# Patient Record
Sex: FEMALE | Race: WHITE | NOT HISPANIC OR LATINO | Employment: STUDENT | URBAN - METROPOLITAN AREA
[De-identification: names, ages, dates, MRNs, and addresses within clinical notes are randomized per-mention and may not be internally consistent; named-entity substitution may affect disease eponyms.]

---

## 2023-04-18 ENCOUNTER — LAB (OUTPATIENT)
Dept: LAB | Facility: LAB | Age: 23
End: 2023-04-18
Payer: COMMERCIAL

## 2023-04-18 ENCOUNTER — OFFICE VISIT (OUTPATIENT)
Dept: PEDIATRICS | Facility: CLINIC | Age: 23
End: 2023-04-18
Payer: COMMERCIAL

## 2023-04-18 VITALS
BODY MASS INDEX: 20.99 KG/M2 | OXYGEN SATURATION: 96 % | RESPIRATION RATE: 20 BRPM | WEIGHT: 134 LBS | TEMPERATURE: 98.5 F | HEART RATE: 88 BPM

## 2023-04-18 DIAGNOSIS — J35.8 TONSILLITH: ICD-10-CM

## 2023-04-18 DIAGNOSIS — R53.83 FATIGUE, UNSPECIFIED TYPE: Primary | ICD-10-CM

## 2023-04-18 DIAGNOSIS — R53.83 FATIGUE, UNSPECIFIED TYPE: ICD-10-CM

## 2023-04-18 DIAGNOSIS — J01.90 ACUTE SINUSITIS, RECURRENCE NOT SPECIFIED, UNSPECIFIED LOCATION: ICD-10-CM

## 2023-04-18 DIAGNOSIS — F43.21 ADJUSTMENT DISORDER WITH DEPRESSED MOOD: ICD-10-CM

## 2023-04-18 PROBLEM — J30.2 SEASONAL ALLERGIES: Status: ACTIVE | Noted: 2023-04-18

## 2023-04-18 PROBLEM — S06.0X9D: Status: ACTIVE | Noted: 2023-04-18

## 2023-04-18 PROBLEM — M79.604 PAIN IN RIGHT LEG: Status: ACTIVE | Noted: 2023-04-18

## 2023-04-18 PROBLEM — Z87.42 PERSONAL HISTORY OF OTHER DISEASES OF THE FEMALE GENITAL TRACT: Status: ACTIVE | Noted: 2023-04-18

## 2023-04-18 PROBLEM — Z30.9 ENCOUNTER FOR CONTRACEPTIVE MANAGEMENT, UNSPECIFIED: Status: ACTIVE | Noted: 2023-04-18

## 2023-04-18 PROCEDURE — 99213 OFFICE O/P EST LOW 20 MIN: CPT | Performed by: PEDIATRICS

## 2023-04-18 PROCEDURE — 36415 COLL VENOUS BLD VENIPUNCTURE: CPT

## 2023-04-18 PROCEDURE — 80053 COMPREHEN METABOLIC PANEL: CPT

## 2023-04-18 PROCEDURE — 84443 ASSAY THYROID STIM HORMONE: CPT

## 2023-04-18 PROCEDURE — 84481 FREE ASSAY (FT-3): CPT

## 2023-04-18 PROCEDURE — 85025 COMPLETE CBC W/AUTO DIFF WBC: CPT

## 2023-04-18 PROCEDURE — 82306 VITAMIN D 25 HYDROXY: CPT

## 2023-04-18 RX ORDER — AZITHROMYCIN 250 MG/1
TABLET, FILM COATED ORAL
Qty: 6 TABLET | Refills: 0 | Status: SHIPPED | OUTPATIENT
Start: 2023-04-18 | End: 2023-04-23

## 2023-04-18 RX ORDER — ALBUTEROL SULFATE 90 UG/1
2 AEROSOL, METERED RESPIRATORY (INHALATION) EVERY 6 HOURS PRN
Qty: 18 G | Refills: 2 | Status: SHIPPED | OUTPATIENT
Start: 2023-04-18 | End: 2024-01-08 | Stop reason: SDUPTHER

## 2023-04-18 NOTE — PROGRESS NOTES
Subjective   Chief Complaint: Cough.  KYLIE Machuca is a 22 y.o. female who presents for Cough, who is accompanied by her  self .    There has been a 4 day history of cough and congestion.  There has not been a fever during this illness.  There has been vomiting or diarrhea.  Brigette has not been able to sleep as well as normal due to these symptoms.   Her cough is has been productive.      She had a rough break-up in February that has resulted in her ex taking her to court.  She has been tearful, sad, and depressed.  She has had some episodes prior to that.  Her grades have suffered.  She has lost 12# and is sleeping more than usual.      Would like to see ENT for recurrent tonsillitis and halitosis and recurrent tonsilliths    Review of Systems    Objective     Pulse 88   Temp 36.9 °C (98.5 °F)   Resp 20   Wt 60.8 kg (134 lb)   SpO2 96%   BMI 20.99 kg/m²     Physical Exam  Constitutional:       Appearance: Normal appearance.   HENT:      Head: Normocephalic and atraumatic.      Right Ear: Tympanic membrane normal.      Left Ear: Tympanic membrane normal.      Nose: Congestion and rhinorrhea present.      Mouth/Throat:      Mouth: Mucous membranes are moist.   Cardiovascular:      Rate and Rhythm: Normal rate and regular rhythm.   Pulmonary:      Effort: Pulmonary effort is normal.      Breath sounds: Normal breath sounds.   Musculoskeletal:      Cervical back: Neck supple.   Neurological:      Mental Status: She is alert.         Assessment/Plan   Problem List Items Addressed This Visit       Acute sinusitis    Relevant Medications    azithromycin (Zithromax) 250 mg tablet    Fatigue - Primary    Relevant Orders    CBC and Auto Differential    Triiodothyronine, Free    Thyroid Stimulating Hormone    Vitamin D 25 hydroxy    Comprehensive Metabolic Panel    Adjustment disorder with depressed mood    Tonsillith    Relevant Orders    Referral to ENT

## 2023-04-18 NOTE — PATIENT INSTRUCTIONS
Take antibiotic as directed for the next 10 days.    Will call with lab results.  Consider treatment for mood and/or consider counseling.      Encourage rest and fluids.    Tylenol and ibuprofen as needed.    Call in 2-3 days if not better.      Referred to ENT.

## 2023-04-19 LAB
ALANINE AMINOTRANSFERASE (SGPT) (U/L) IN SER/PLAS: 10 U/L (ref 7–45)
ALBUMIN (G/DL) IN SER/PLAS: 4.6 G/DL (ref 3.4–5)
ALKALINE PHOSPHATASE (U/L) IN SER/PLAS: 76 U/L (ref 33–110)
ANION GAP IN SER/PLAS: 12 MMOL/L (ref 10–20)
ASPARTATE AMINOTRANSFERASE (SGOT) (U/L) IN SER/PLAS: 16 U/L (ref 9–39)
BASOPHILS (10*3/UL) IN BLOOD BY AUTOMATED COUNT: 0.04 X10E9/L (ref 0–0.1)
BASOPHILS/100 LEUKOCYTES IN BLOOD BY AUTOMATED COUNT: 0.5 % (ref 0–2)
BILIRUBIN TOTAL (MG/DL) IN SER/PLAS: 1.3 MG/DL (ref 0–1.2)
CALCIDIOL (25 OH VITAMIN D3) (NG/ML) IN SER/PLAS: 51 NG/ML
CALCIUM (MG/DL) IN SER/PLAS: 10 MG/DL (ref 8.6–10.6)
CARBON DIOXIDE, TOTAL (MMOL/L) IN SER/PLAS: 27 MMOL/L (ref 21–32)
CHLORIDE (MMOL/L) IN SER/PLAS: 106 MMOL/L (ref 98–107)
CREATININE (MG/DL) IN SER/PLAS: 0.84 MG/DL (ref 0.5–1.05)
EOSINOPHILS (10*3/UL) IN BLOOD BY AUTOMATED COUNT: 0.43 X10E9/L (ref 0–0.7)
EOSINOPHILS/100 LEUKOCYTES IN BLOOD BY AUTOMATED COUNT: 5.8 % (ref 0–6)
ERYTHROCYTE DISTRIBUTION WIDTH (RATIO) BY AUTOMATED COUNT: 12.2 % (ref 11.5–14.5)
ERYTHROCYTE MEAN CORPUSCULAR HEMOGLOBIN CONCENTRATION (G/DL) BY AUTOMATED: 32.9 G/DL (ref 32–36)
ERYTHROCYTE MEAN CORPUSCULAR VOLUME (FL) BY AUTOMATED COUNT: 89 FL (ref 80–100)
ERYTHROCYTES (10*6/UL) IN BLOOD BY AUTOMATED COUNT: 4.73 X10E12/L (ref 4–5.2)
GFR FEMALE: >90 ML/MIN/1.73M2
GLUCOSE (MG/DL) IN SER/PLAS: 94 MG/DL (ref 74–99)
HEMATOCRIT (%) IN BLOOD BY AUTOMATED COUNT: 42.3 % (ref 36–46)
HEMOGLOBIN (G/DL) IN BLOOD: 13.9 G/DL (ref 12–16)
IMMATURE GRANULOCYTES/100 LEUKOCYTES IN BLOOD BY AUTOMATED COUNT: 0.3 % (ref 0–0.9)
LEUKOCYTES (10*3/UL) IN BLOOD BY AUTOMATED COUNT: 7.4 X10E9/L (ref 4.4–11.3)
LYMPHOCYTES (10*3/UL) IN BLOOD BY AUTOMATED COUNT: 1.46 X10E9/L (ref 1.2–4.8)
LYMPHOCYTES/100 LEUKOCYTES IN BLOOD BY AUTOMATED COUNT: 19.8 % (ref 13–44)
MONOCYTES (10*3/UL) IN BLOOD BY AUTOMATED COUNT: 0.79 X10E9/L (ref 0.1–1)
MONOCYTES/100 LEUKOCYTES IN BLOOD BY AUTOMATED COUNT: 10.7 % (ref 2–10)
NEUTROPHILS (10*3/UL) IN BLOOD BY AUTOMATED COUNT: 4.63 X10E9/L (ref 1.2–7.7)
NEUTROPHILS/100 LEUKOCYTES IN BLOOD BY AUTOMATED COUNT: 62.9 % (ref 40–80)
NRBC (PER 100 WBCS) BY AUTOMATED COUNT: 0 /100 WBC (ref 0–0)
PLATELETS (10*3/UL) IN BLOOD AUTOMATED COUNT: 249 X10E9/L (ref 150–450)
POTASSIUM (MMOL/L) IN SER/PLAS: 4.7 MMOL/L (ref 3.5–5.3)
PROTEIN TOTAL: 7.2 G/DL (ref 6.4–8.2)
SODIUM (MMOL/L) IN SER/PLAS: 140 MMOL/L (ref 136–145)
THYROTROPIN (MIU/L) IN SER/PLAS BY DETECTION LIMIT <= 0.05 MIU/L: 1.12 MIU/L (ref 0.44–3.98)
TRIIODOTHYRONINE (T3) FREE (PG/ML) IN SER/PLAS: 3.6 PG/ML (ref 3–4.7)
UREA NITROGEN (MG/DL) IN SER/PLAS: 11 MG/DL (ref 6–23)

## 2023-05-12 ENCOUNTER — OFFICE VISIT (OUTPATIENT)
Dept: PEDIATRICS | Facility: CLINIC | Age: 23
End: 2023-05-12
Payer: COMMERCIAL

## 2023-05-12 VITALS
HEART RATE: 60 BPM | SYSTOLIC BLOOD PRESSURE: 114 MMHG | BODY MASS INDEX: 20.96 KG/M2 | DIASTOLIC BLOOD PRESSURE: 70 MMHG | WEIGHT: 133.8 LBS

## 2023-05-12 DIAGNOSIS — Z00.00 WELL ADULT EXAM: Primary | ICD-10-CM

## 2023-05-12 PROBLEM — J93.9 PNEUMOTHORAX ON LEFT: Status: RESOLVED | Noted: 2020-12-17 | Resolved: 2023-05-12

## 2023-05-12 PROCEDURE — 99395 PREV VISIT EST AGE 18-39: CPT | Performed by: PEDIATRICS

## 2023-05-12 PROCEDURE — 1036F TOBACCO NON-USER: CPT | Performed by: PEDIATRICS

## 2023-05-12 RX ORDER — DESOGESTREL AND ETHINYL ESTRADIOL 0.15-0.03
1 KIT ORAL
COMMUNITY
Start: 2022-10-14

## 2023-05-12 NOTE — PROGRESS NOTES
Subjective   HPI    Brigette is a 22 y.o. 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    Concerns today: no cheer at [employer] [facility] [employer]    General Health: she is overall in good health.     Social and Family History: There are no interval changes in child's social and family history. Appropriate parent-teen interactions were observed.     Nutrition: Brigette eats a variety of foods including dairy products, fruits, vegetables, meats, and grains/cereals.    Menstrual cycles regular? yes    Sleep:  Sleep patterns appropriate? yes    Behavior: Behavior is appropriate for age.  Peer relationships are appropriate.     PHQ-9 completed? no, not indicated    [employer] graduating in 2025    Sports:  participates in sports?  yes (cheer)   Any history of concussion?: yes   Any history of fainting? no   Any history of chest pain with exercise? no   Any first degree relative with heart attack or unexplained death prior to age 50? no    Driving: Brigette has a 's license?  yes - 's License  Moving violations? no  Accidents? no  Reviewed car safety such as distracted driving, drinking and driving, speeding, etc.    Dental Care:  regular dental visits? yes  water is fluoridated? yes    Safety topics reviewed:  Brigette uses seat belts appropriately.  There are smoke detectors in the home. Carbon monoxide detectors are used in the home.    Brigette does own a bicycle helmet and uses it appropriately when riding bikes or scooters.  There is no use of tobacco or other substances.      Review of Systems    Objective     /70   Pulse 60   Wt 60.7 kg (133 lb 12.8 oz)   BMI 20.96 kg/m²       Physical Exam  Vitals and nursing note reviewed. Exam conducted with a chaperone present.   Constitutional:       Appearance: Normal appearance.   HENT:      Head: Normocephalic and atraumatic.      Right Ear: Tympanic membrane, ear canal and external ear normal.      Left Ear: Tympanic membrane,  ear canal and external ear normal.      Nose: Nose normal.      Mouth/Throat:      Mouth: Mucous membranes are moist.   Eyes:      Extraocular Movements: Extraocular movements intact.      Conjunctiva/sclera: Conjunctivae normal.      Pupils: Pupils are equal, round, and reactive to light.   Cardiovascular:      Rate and Rhythm: Normal rate and regular rhythm.      Pulses: Normal pulses.      Heart sounds: Normal heart sounds.   Pulmonary:      Effort: Pulmonary effort is normal.      Breath sounds: Normal breath sounds.   Abdominal:      General: Abdomen is flat. Bowel sounds are normal.      Palpations: Abdomen is soft. There is no mass.   Musculoskeletal:         General: Normal range of motion.      Cervical back: Normal range of motion and neck supple.   Lymphadenopathy:      Cervical: No cervical adenopathy.   Skin:     General: Skin is warm.   Neurological:      General: No focal deficit present.      Mental Status: She is alert.      Cranial Nerves: No cranial nerve deficit.   Psychiatric:         Mood and Affect: Mood normal.         Assessment/Plan   Problem List Items Addressed This Visit       Well adult exam - Primary

## 2024-01-08 ENCOUNTER — OFFICE VISIT (OUTPATIENT)
Dept: PEDIATRICS | Facility: CLINIC | Age: 24
End: 2024-01-08
Payer: COMMERCIAL

## 2024-01-08 VITALS
OXYGEN SATURATION: 100 % | BODY MASS INDEX: 22.08 KG/M2 | TEMPERATURE: 98.1 F | RESPIRATION RATE: 16 BRPM | HEART RATE: 60 BPM | WEIGHT: 141 LBS

## 2024-01-08 DIAGNOSIS — J01.90 ACUTE SINUSITIS, RECURRENCE NOT SPECIFIED, UNSPECIFIED LOCATION: Primary | ICD-10-CM

## 2024-01-08 DIAGNOSIS — R06.2 WHEEZING: ICD-10-CM

## 2024-01-08 DIAGNOSIS — G47.9 SLEEP DISORDER: ICD-10-CM

## 2024-01-08 PROCEDURE — 99213 OFFICE O/P EST LOW 20 MIN: CPT | Performed by: PEDIATRICS

## 2024-01-08 PROCEDURE — 1036F TOBACCO NON-USER: CPT | Performed by: PEDIATRICS

## 2024-01-08 RX ORDER — PREDNISONE 50 MG/1
50 TABLET ORAL DAILY
Qty: 5 TABLET | Refills: 0 | Status: SHIPPED | OUTPATIENT
Start: 2024-01-08 | End: 2024-01-13

## 2024-01-08 RX ORDER — AZITHROMYCIN 250 MG/1
TABLET, FILM COATED ORAL
Qty: 6 TABLET | Refills: 0 | Status: SHIPPED | OUTPATIENT
Start: 2024-01-08 | End: 2024-01-13

## 2024-01-08 RX ORDER — ALBUTEROL SULFATE 90 UG/1
2 AEROSOL, METERED RESPIRATORY (INHALATION) EVERY 6 HOURS PRN
Qty: 18 G | Refills: 2 | Status: SHIPPED | OUTPATIENT
Start: 2024-01-08

## 2024-01-08 ASSESSMENT — ENCOUNTER SYMPTOMS: COUGH: 1

## 2024-01-08 NOTE — PROGRESS NOTES
Subjective   Chief Complaint: Cough.  Cough    Brigette is a 23 y.o. female who presents for Cough, who is accompanied by her mother.    There has been a 3 week history of cough and congestion.  There has not been a fever during this illness.  There has not been vomiting or diarrhea.  Brigette has not been able to sleep as well as normal due to these symptoms.  She has felt like her cough is productive at times and is worse at night.          Review of Systems   Respiratory:  Positive for cough.        Objective     Pulse 60   Temp 36.7 °C (98.1 °F)   Resp 16   Wt 64 kg (141 lb)   SpO2 100%   BMI 22.08 kg/m²     Physical Exam  Constitutional:       Appearance: Normal appearance.   HENT:      Head: Normocephalic and atraumatic.      Right Ear: Tympanic membrane normal.      Left Ear: Tympanic membrane normal.      Nose: Rhinorrhea present.      Mouth/Throat:      Mouth: Mucous membranes are moist.   Cardiovascular:      Rate and Rhythm: Normal rate and regular rhythm.   Pulmonary:      Effort: Pulmonary effort is normal.      Breath sounds: Wheezing and rhonchi present.   Musculoskeletal:      Cervical back: Neck supple.   Neurological:      Mental Status: She is alert.       Assessment/Plan   Problem List Items Addressed This Visit       Acute sinusitis - Primary    Relevant Medications    azithromycin (Zithromax) 250 mg tablet    Wheezing    Relevant Medications    albuterol 90 mcg/actuation inhaler    predniSONE (Deltasone) 50 mg tablet    Sleep disorder    Relevant Orders    Referral to Adult Sleep Medicine

## 2024-01-08 NOTE — PATIENT INSTRUCTIONS
Take antibiotic as directed for the next 5 days.    Prednisone 50 mg a day for 5 days.    Albuterol.    Encourage rest and fluids.    Tylenol and ibuprofen as needed.    Call in 2-3 days if not better.     Call [phone] for referral line (sleep medicine Dr. Treviño    
Admitted

## 2024-10-22 ENCOUNTER — APPOINTMENT (OUTPATIENT)
Dept: PEDIATRICS | Facility: CLINIC | Age: 24
End: 2024-10-22
Payer: COMMERCIAL

## 2024-10-22 ENCOUNTER — LAB (OUTPATIENT)
Dept: LAB | Facility: LAB | Age: 24
End: 2024-10-22
Payer: COMMERCIAL

## 2024-10-22 VITALS
SYSTOLIC BLOOD PRESSURE: 122 MMHG | DIASTOLIC BLOOD PRESSURE: 78 MMHG | HEART RATE: 60 BPM | HEIGHT: 67 IN | WEIGHT: 139 LBS | BODY MASS INDEX: 21.82 KG/M2

## 2024-10-22 DIAGNOSIS — L65.9 HAIR THINNING: ICD-10-CM

## 2024-10-22 DIAGNOSIS — Z00.00 WELL ADULT EXAM: Primary | ICD-10-CM

## 2024-10-22 DIAGNOSIS — Z00.00 WELL ADULT EXAM: ICD-10-CM

## 2024-10-22 DIAGNOSIS — R53.83 FATIGUE, UNSPECIFIED TYPE: ICD-10-CM

## 2024-10-22 PROBLEM — J35.8 TONSILLITH: Status: RESOLVED | Noted: 2023-04-18 | Resolved: 2024-10-22

## 2024-10-22 PROBLEM — M79.604 PAIN IN RIGHT LEG: Status: RESOLVED | Noted: 2023-04-18 | Resolved: 2024-10-22

## 2024-10-22 PROBLEM — J01.90 ACUTE SINUSITIS: Status: RESOLVED | Noted: 2023-04-18 | Resolved: 2024-10-22

## 2024-10-22 PROBLEM — S06.0X9D: Status: RESOLVED | Noted: 2023-04-18 | Resolved: 2024-10-22

## 2024-10-22 PROCEDURE — 84443 ASSAY THYROID STIM HORMONE: CPT

## 2024-10-22 PROCEDURE — 82306 VITAMIN D 25 HYDROXY: CPT

## 2024-10-22 PROCEDURE — 36415 COLL VENOUS BLD VENIPUNCTURE: CPT

## 2024-10-22 PROCEDURE — 3008F BODY MASS INDEX DOCD: CPT | Performed by: PEDIATRICS

## 2024-10-22 PROCEDURE — 84402 ASSAY OF FREE TESTOSTERONE: CPT

## 2024-10-22 PROCEDURE — 85025 COMPLETE CBC W/AUTO DIFF WBC: CPT

## 2024-10-22 PROCEDURE — 80053 COMPREHEN METABOLIC PANEL: CPT

## 2024-10-22 PROCEDURE — 83540 ASSAY OF IRON: CPT

## 2024-10-22 PROCEDURE — 82465 ASSAY BLD/SERUM CHOLESTEROL: CPT

## 2024-10-22 PROCEDURE — 99395 PREV VISIT EST AGE 18-39: CPT | Performed by: PEDIATRICS

## 2024-10-22 PROCEDURE — 1036F TOBACCO NON-USER: CPT | Performed by: PEDIATRICS

## 2024-10-22 PROCEDURE — 82627 DEHYDROEPIANDROSTERONE: CPT

## 2024-10-22 PROCEDURE — 83718 ASSAY OF LIPOPROTEIN: CPT

## 2024-10-22 PROCEDURE — 83550 IRON BINDING TEST: CPT

## 2024-10-22 PROCEDURE — 82728 ASSAY OF FERRITIN: CPT

## 2024-10-22 RX ORDER — BIOTIN 1 MG
1000 TABLET ORAL DAILY
COMMUNITY

## 2024-10-22 ASSESSMENT — PATIENT HEALTH QUESTIONNAIRE - PHQ9
2. FEELING DOWN, DEPRESSED OR HOPELESS: NOT AT ALL
8. MOVING OR SPEAKING SO SLOWLY THAT OTHER PEOPLE COULD HAVE NOTICED. OR THE OPPOSITE - BEING SO FIDGETY OR RESTLESS THAT YOU HAVE BEEN MOVING AROUND A LOT MORE THAN USUAL: NOT AT ALL
4. FEELING TIRED OR HAVING LITTLE ENERGY: MORE THAN HALF THE DAYS
9. THOUGHTS THAT YOU WOULD BE BETTER OFF DEAD, OR OF HURTING YOURSELF: NOT AT ALL
8. MOVING OR SPEAKING SO SLOWLY THAT OTHER PEOPLE COULD HAVE NOTICED. OR THE OPPOSITE, BEING SO FIGETY OR RESTLESS THAT YOU HAVE BEEN MOVING AROUND A LOT MORE THAN USUAL: NOT AT ALL
3. TROUBLE FALLING OR STAYING ASLEEP: MORE THAN HALF THE DAYS
5. POOR APPETITE OR OVEREATING: NOT AT ALL
SUM OF ALL RESPONSES TO PHQ9 QUESTIONS 1 & 2: 0
7. TROUBLE CONCENTRATING ON THINGS, SUCH AS READING THE NEWSPAPER OR WATCHING TELEVISION: NOT AT ALL
7. TROUBLE CONCENTRATING ON THINGS, SUCH AS READING THE NEWSPAPER OR WATCHING TELEVISION: NOT AT ALL
5. POOR APPETITE OR OVEREATING: NOT AT ALL
9. THOUGHTS THAT YOU WOULD BE BETTER OFF DEAD, OR OF HURTING YOURSELF: NOT AT ALL
10. IF YOU CHECKED OFF ANY PROBLEMS, HOW DIFFICULT HAVE THESE PROBLEMS MADE IT FOR YOU TO DO YOUR WORK, TAKE CARE OF THINGS AT HOME, OR GET ALONG WITH OTHER PEOPLE: SOMEWHAT DIFFICULT
2. FEELING DOWN, DEPRESSED OR HOPELESS: NOT AT ALL
10. IF YOU CHECKED OFF ANY PROBLEMS, HOW DIFFICULT HAVE THESE PROBLEMS MADE IT FOR YOU TO DO YOUR WORK, TAKE CARE OF THINGS AT HOME, OR GET ALONG WITH OTHER PEOPLE: SOMEWHAT DIFFICULT
6. FEELING BAD ABOUT YOURSELF - OR THAT YOU ARE A FAILURE OR HAVE LET YOURSELF OR YOUR FAMILY DOWN: NOT AT ALL
SUM OF ALL RESPONSES TO PHQ QUESTIONS 1-9: 4
3. TROUBLE FALLING OR STAYING ASLEEP OR SLEEPING TOO MUCH: MORE THAN HALF THE DAYS
1. LITTLE INTEREST OR PLEASURE IN DOING THINGS: NOT AT ALL
6. FEELING BAD ABOUT YOURSELF - OR THAT YOU ARE A FAILURE OR HAVE LET YOURSELF OR YOUR FAMILY DOWN: NOT AT ALL
1. LITTLE INTEREST OR PLEASURE IN DOING THINGS: NOT AT ALL
4. FEELING TIRED OR HAVING LITTLE ENERGY: MORE THAN HALF THE DAYS

## 2024-10-22 ASSESSMENT — ENCOUNTER SYMPTOMS
COUGH: 0
FREQUENCY: 0
DIARRHEA: 0
SORE THROAT: 0
ABDOMINAL PAIN: 0
MYALGIAS: 0
NERVOUS/ANXIOUS: 0
HEADACHES: 1
ADENOPATHY: 0
BRUISES/BLEEDS EASILY: 0
VOMITING: 0
FEVER: 0
CONSTIPATION: 1
POLYPHAGIA: 0
HYPERACTIVE: 0
BLOOD IN STOOL: 0

## 2024-10-22 NOTE — PROGRESS NOTES
Subjective   HPI    Brigette is a 24 y.o. 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    Concerns today: yes (hair thinning and fatigue)    Just started biotin in past week or so.  Past 2 months hair loss noticeable.      Questionnaires reviewed during this visit: PHQ-9      General Health: she is overall in good health.     Social and Family History: There are no interval changes in child's social and family history. Appropriate parent-teen interactions were observed.     Nutrition: Brigette eats a variety of foods including dairy products, fruits, vegetables, meats, and grains/cereals.    Menstrual cycles regular? yes    Sleep:  Sleep patterns appropriate? yes but never feels rested even with 11+ hours of sleep    Behavior: Behavior is appropriate for age.  Peer relationships are appropriate.     PHQ-9 completed? yes, with a score of 4    School:   [employer].      Driving: Brigette has a 's license?  yes - 's License  Moving violations? no  Accidents? no  Reviewed car safety such as distracted driving, drinking and driving, speeding, etc.    Dental Care:  regular dental visits? yes  water is fluoridated? yes    Safety topics reviewed:  Brigette uses seat belts appropriately.  There are smoke detectors in the home. Carbon monoxide detectors are used in the home.    There is no use of tobacco or other substances.      Review of Systems   Constitutional:  Negative for fever.   HENT:  Negative for sore throat.    Respiratory:  Negative for cough.    Cardiovascular:  Negative for leg swelling.   Gastrointestinal:  Positive for constipation. Negative for abdominal pain, blood in stool, diarrhea and vomiting.   Endocrine: Positive for cold intolerance. Negative for heat intolerance, polyphagia and polyuria.   Genitourinary:  Negative for frequency.   Musculoskeletal:  Negative for myalgias.   Neurological:  Positive for headaches.  "  Hematological:  Negative for adenopathy. Does not bruise/bleed easily.   Psychiatric/Behavioral:  The patient is not nervous/anxious and is not hyperactive.        Objective     /78   Pulse 60   Ht 1.695 m (5' 6.75\")   Wt 63 kg (139 lb)   BMI 21.93 kg/m²       Physical Exam  Vitals and nursing note reviewed. Exam conducted with a chaperone present.   Constitutional:       Appearance: Normal appearance.   HENT:      Head: Normocephalic and atraumatic.      Right Ear: Tympanic membrane, ear canal and external ear normal.      Left Ear: Tympanic membrane, ear canal and external ear normal.      Nose: Nose normal.      Mouth/Throat:      Mouth: Mucous membranes are moist.   Eyes:      Extraocular Movements: Extraocular movements intact.      Conjunctiva/sclera: Conjunctivae normal.      Pupils: Pupils are equal, round, and reactive to light.   Cardiovascular:      Rate and Rhythm: Normal rate and regular rhythm.      Pulses: Normal pulses.      Heart sounds: Normal heart sounds.   Pulmonary:      Effort: Pulmonary effort is normal.      Breath sounds: Normal breath sounds.   Abdominal:      General: Abdomen is flat. Bowel sounds are normal.      Palpations: Abdomen is soft. There is no mass.   Musculoskeletal:         General: Normal range of motion.      Cervical back: Normal range of motion and neck supple.   Lymphadenopathy:      Cervical: No cervical adenopathy.   Skin:     General: Skin is warm.   Neurological:      General: No focal deficit present.      Mental Status: She is alert.      Cranial Nerves: No cranial nerve deficit.   Psychiatric:         Mood and Affect: Mood normal.         Assessment/Plan   Problem List Items Addressed This Visit       Fatigue    Relevant Orders    CBC and Auto Differential    Iron and TIBC    Ferritin    Vitamin D 25-Hydroxy,Total (for eval of Vitamin D levels)    Comprehensive metabolic panel    TSH with reflex to Free T4 if abnormal    Well adult exam - Primary    " Relevant Orders    Lipid Panel Non-Fasting    Follow Up In Pediatrics - Health Maintenance    Body mass index (BMI) of 21.0 to 21.9 in adult    Hair thinning    Relevant Orders    CBC and Auto Differential    Iron and TIBC    Ferritin    Vitamin D 25-Hydroxy,Total (for eval of Vitamin D levels)    Comprehensive metabolic panel    TSH with reflex to Free T4 if abnormal    Testosterone,Free and Total    DHEA-Sulfate

## 2024-10-23 LAB
25(OH)D3 SERPL-MCNC: 59 NG/ML (ref 30–100)
ALBUMIN SERPL BCP-MCNC: 4.1 G/DL (ref 3.4–5)
ALP SERPL-CCNC: 54 U/L (ref 33–110)
ALT SERPL W P-5'-P-CCNC: 8 U/L (ref 7–45)
ANION GAP SERPL CALC-SCNC: 13 MMOL/L (ref 10–20)
AST SERPL W P-5'-P-CCNC: 13 U/L (ref 9–39)
BASOPHILS # BLD AUTO: 0.03 X10*3/UL (ref 0–0.1)
BASOPHILS NFR BLD AUTO: 0.4 %
BILIRUB SERPL-MCNC: 0.8 MG/DL (ref 0–1.2)
BUN SERPL-MCNC: 17 MG/DL (ref 6–23)
CALCIUM SERPL-MCNC: 9.4 MG/DL (ref 8.6–10.6)
CHLORIDE SERPL-SCNC: 108 MMOL/L (ref 98–107)
CHOLEST SERPL-MCNC: 152 MG/DL (ref 0–199)
CHOLESTEROL/HDL RATIO: 2.7
CO2 SERPL-SCNC: 25 MMOL/L (ref 21–32)
CREAT SERPL-MCNC: 0.91 MG/DL (ref 0.5–1.05)
DHEA-S SERPL-MCNC: 136 UG/DL (ref 65–395)
EGFRCR SERPLBLD CKD-EPI 2021: >90 ML/MIN/1.73M*2
EOSINOPHIL # BLD AUTO: 0.19 X10*3/UL (ref 0–0.7)
EOSINOPHIL NFR BLD AUTO: 2.8 %
ERYTHROCYTE [DISTWIDTH] IN BLOOD BY AUTOMATED COUNT: 11.5 % (ref 11.5–14.5)
FERRITIN SERPL-MCNC: 37 NG/ML (ref 8–150)
GLUCOSE SERPL-MCNC: 88 MG/DL (ref 74–99)
HCT VFR BLD AUTO: 37.7 % (ref 36–46)
HDLC SERPL-MCNC: 55.4 MG/DL
HGB BLD-MCNC: 12.4 G/DL (ref 12–16)
IMM GRANULOCYTES # BLD AUTO: 0.01 X10*3/UL (ref 0–0.7)
IMM GRANULOCYTES NFR BLD AUTO: 0.1 % (ref 0–0.9)
IRON SATN MFR SERPL: 14 % (ref 25–45)
IRON SERPL-MCNC: 43 UG/DL (ref 35–150)
LYMPHOCYTES # BLD AUTO: 1.86 X10*3/UL (ref 1.2–4.8)
LYMPHOCYTES NFR BLD AUTO: 27 %
MCH RBC QN AUTO: 30 PG (ref 26–34)
MCHC RBC AUTO-ENTMCNC: 32.9 G/DL (ref 32–36)
MCV RBC AUTO: 91 FL (ref 80–100)
MONOCYTES # BLD AUTO: 0.66 X10*3/UL (ref 0.1–1)
MONOCYTES NFR BLD AUTO: 9.6 %
NEUTROPHILS # BLD AUTO: 4.15 X10*3/UL (ref 1.2–7.7)
NEUTROPHILS NFR BLD AUTO: 60.1 %
NON-HDL CHOLESTEROL: 97 MG/DL (ref 0–149)
NRBC BLD-RTO: 0 /100 WBCS (ref 0–0)
PLATELET # BLD AUTO: 246 X10*3/UL (ref 150–450)
POTASSIUM SERPL-SCNC: 4.2 MMOL/L (ref 3.5–5.3)
PROT SERPL-MCNC: 6.5 G/DL (ref 6.4–8.2)
RBC # BLD AUTO: 4.13 X10*6/UL (ref 4–5.2)
SODIUM SERPL-SCNC: 142 MMOL/L (ref 136–145)
TIBC SERPL-MCNC: 316 UG/DL (ref 240–445)
TSH SERPL-ACNC: 0.92 MIU/L (ref 0.44–3.98)
UIBC SERPL-MCNC: 273 UG/DL (ref 110–370)
WBC # BLD AUTO: 6.9 X10*3/UL (ref 4.4–11.3)

## 2024-10-26 LAB
TESTOSTERONE FREE (CHAN): 0.8 PG/ML (ref 0.1–6.4)
TESTOSTERONE,TOTAL,LC-MS/MS: 10 NG/DL (ref 2–45)

## 2025-07-08 ENCOUNTER — OFFICE VISIT (OUTPATIENT)
Dept: URGENT CARE | Age: 25
End: 2025-07-08
Payer: COMMERCIAL

## 2025-07-08 VITALS
HEART RATE: 83 BPM | DIASTOLIC BLOOD PRESSURE: 74 MMHG | RESPIRATION RATE: 20 BRPM | WEIGHT: 136 LBS | SYSTOLIC BLOOD PRESSURE: 113 MMHG | OXYGEN SATURATION: 98 % | TEMPERATURE: 98.6 F | BODY MASS INDEX: 21.35 KG/M2 | HEIGHT: 67 IN

## 2025-07-08 DIAGNOSIS — J02.0 STREP THROAT: Primary | ICD-10-CM

## 2025-07-08 DIAGNOSIS — J02.9 SORE THROAT: ICD-10-CM

## 2025-07-08 LAB
POC HUMAN RHINOVIRUS PCR: NEGATIVE
POC INFLUENZA A VIRUS PCR: NEGATIVE
POC INFLUENZA B VIRUS PCR: NEGATIVE
POC RESPIRATORY SYNCYTIAL VIRUS PCR: NEGATIVE
POC STREPTOCOCCUS PYOGENES (GROUP A STREP) PCR: POSITIVE

## 2025-07-08 RX ORDER — AMOXICILLIN 500 MG/1
500 CAPSULE ORAL EVERY 12 HOURS SCHEDULED
Qty: 21 CAPSULE | Refills: 0 | Status: SHIPPED | OUTPATIENT
Start: 2025-07-08 | End: 2025-07-18

## 2025-07-08 RX ADMIN — Medication 10 MG: at 18:22

## 2025-07-08 ASSESSMENT — PAIN SCALES - GENERAL: PAINLEVEL_OUTOF10: 8

## 2025-07-08 NOTE — PROGRESS NOTES
"Subjective   Patient ID: Brigette Atkins is a 24 y.o. female. They present today with a chief complaint of Sore Throat.    History of Present Illness  24-year-old female presents with complaint of sore throat and a swollen gland on the neck, both of which started this morning. She denies any recent illness or known sick contacts. She has not taken any medications or tried any home treatments for these symptoms.Denies fever, chills, cough, nasal congestion, ear pain, difficulty swallowing, voice changes, rash, or recent dental issues.        Past Medical History  Allergies as of 07/08/2025    (No Known Allergies)       Prescriptions Prior to Admission[1]     Medical History[2]    Surgical History[3]     reports that she has never smoked. She has never been exposed to tobacco smoke. She has never used smokeless tobacco. She reports that she does not currently use alcohol.    Review of Systems  Review of Systems  Constitutional: Denies fever, chills, fatigue.    HEENT: Positive for sore throat and swollen neck gland. Denies ear pain, congestion, rhinorrhea, oral lesions, or hoarseness.    Respiratory: Denies cough, wheezing, or shortness of breath.    GI: Denies nausea, vomiting, abdominal pain.    Skin: No rashes or lesions.    Objective    Vitals:    07/08/25 1748   BP: 113/74   Pulse: 83   Resp: 20   Temp: 37 °C (98.6 °F)   TempSrc: Oral   SpO2: 98%   Weight: 61.7 kg (136 lb)   Height: 1.702 m (5' 7\")     No LMP recorded.    Physical Exam  General: Alert, nontoxic, no acute distress.    HEENT:  Oropharynx: Mild to moderate erythema of posterior pharynx with tonsillar swelling and exudates.  Neck: Tender right anterior cervical lymphadenopathy, no fluctuance.  Ears/Nose: Clear, no sinus tenderness, TMs normal.    Respiratory: Lungs clear to auscultation bilaterally.    Cardiovascular: RRR, no murmur.    Skin: No rashes or lesions.    Procedures    Point of Care Test & Imaging Results from this visit  Results for " orders placed or performed in visit on 07/08/25   POCT SPOTFIRE R/ST Panel Mini w/Strep A ([facility] manually resulted   Result Value Ref Range    POC Group A Strep, PCR Positive (A) Negative    POC Respiratory Syncytial Virus PCR Negative Negative    POC Influenza A Virus PCR Negative Negative    POC Influenza B Virus PCR Negative Negative    POC Human Rhinovirus PCR Negative Negative      Imaging  No results found.    Cardiology, Vascular, and Other Imaging  No other imaging results found for the past 2 days      Diagnostic study results (if any) were reviewed by ISADORA Burns.    Assessment/Plan   Allergies, medications, history, and pertinent labs/EKGs/Imaging reviewed by ISADORA Burns.     Medical Decision Making  24-year-old female with acute sore throat and cervical lymphadenopathy. POCT positive for Group A Strep. No evidence of peritonsillar abscess, airway compromise, or systemic infection. Dexamethasone given orally in clinic for symptomatic relief. Amoxicillin prescribed for bacterial pharyngitis. Patient advised on supportive care, precautions, and to return if symptoms worsen. Patient verbalized understanding and agreeable with plan of care.       Orders and Diagnoses  Diagnoses and all orders for this visit:  Strep throat  -     dexAMETHasone (Decadron) 10 mg/mL oral liquid 10 mg  -     amoxicillin (Amoxil) 500 mg capsule; Take 1 capsule (500 mg) by mouth every 12 hours for 10 days.  Sore throat  -     POCT SPOTFIRE R/ST Panel Mini w/Strep A ([facility]) manually resulted      Medical Admin Record  Administrations This Visit       dexAMETHasone (Decadron) 10 mg/mL oral liquid 10 mg       Admin Date  07/08/2025 Action  Given Dose  10 mg Route  oral Documented By  Janet Ramirez MA                    Patient disposition: Home    Electronically signed by ISADORA Burns  6:31 PM           [1] (Not in a hospital admission)   [2]   Past Medical History:  Diagnosis  Date    Concussion with loss of consciousness of unspecified duration, subsequent encounter 10/29/2019    Concussion with loss of consciousness, subsequent encounter    Encounter for contraceptive management, unspecified 07/23/2019    Encounter for contraceptive management    Localized swelling, mass and lump, unspecified lower limb 03/06/2020    Localized swelling of lower leg    Pain in right leg 03/06/2020    Pain in both lower extremities    Personal history of other diseases of the female genital tract 07/24/2020    History of dysmenorrhea    Personal history of other diseases of the respiratory system     History of asthma    Personal history of other diseases of the respiratory system 03/07/2022    History of acute pharyngitis    Pneumothorax on left 12/17/2020    Urinary tract infection, site not specified 10/16/2017    UTI (urinary tract infection)   [3] History reviewed. No pertinent surgical history.

## 2025-07-08 NOTE — PATIENT INSTRUCTIONS
You have strep throat, a bacterial infection that requires antibiotics for treatment. Follow these instructions to help with recovery:    Medications:  Take all prescribed antibiotics as directed, even if you start feeling better.  Use acetaminophen (Tylenol) or ibuprofen (Advil, Motrin) for fever or pain as needed.  Home Care:  Get plenty of rest to help your body heal.  Drink fluids like water, warm tea, or broth to stay hydrated.  Gargle warm salt water (½ teaspoon salt in 8 oz of warm water) to soothe your throat.  Use lozenges or honey (if not allergic) to relieve throat discomfort.  Replace your toothbrush after you have been taking the antibiotics for 24-48 hours.  Avoid spreading the infection--wash your hands frequently and avoid sharing utensils or drinks.  When to Seek Medical Attention:  Fever higher than 102°F or lasting more than 3 days.  Trouble swallowing or breathing.  Severe throat pain despite medication.  No improvement after 48 hours of antibiotics.  Follow up with your doctor if symptoms worsen or do not improve.